# Patient Record
Sex: FEMALE | Race: WHITE | NOT HISPANIC OR LATINO | Employment: UNEMPLOYED | URBAN - METROPOLITAN AREA
[De-identification: names, ages, dates, MRNs, and addresses within clinical notes are randomized per-mention and may not be internally consistent; named-entity substitution may affect disease eponyms.]

---

## 2019-05-25 ENCOUNTER — HOSPITAL ENCOUNTER (EMERGENCY)
Facility: HOSPITAL | Age: 34
Discharge: HOME | End: 2019-05-25
Attending: EMERGENCY MEDICINE | Admitting: EMERGENCY MEDICINE

## 2019-05-25 VITALS
HEIGHT: 62 IN | BODY MASS INDEX: 21.16 KG/M2 | OXYGEN SATURATION: 97 % | HEART RATE: 91 BPM | DIASTOLIC BLOOD PRESSURE: 75 MMHG | SYSTOLIC BLOOD PRESSURE: 114 MMHG | RESPIRATION RATE: 18 BRPM | WEIGHT: 115 LBS | TEMPERATURE: 98.1 F

## 2019-05-25 DIAGNOSIS — F19.10 SUBSTANCE ABUSE (HCC): ICD-10-CM

## 2019-05-25 DIAGNOSIS — F41.9 ANXIETY: Primary | ICD-10-CM

## 2019-05-25 LAB
AMPHET+METHAMPHET UR QL: POSITIVE
ANION GAP SERPL CALCULATED.3IONS-SCNC: 14.7 MMOL/L
BARBITURATES UR QL SCN: NEGATIVE
BENZODIAZ UR QL SCN: NEGATIVE
BUN BLD-MCNC: 14 MG/DL (ref 6–20)
BUN/CREAT SERPL: 16.9 (ref 7–25)
CALCIUM SPEC-SCNC: 9.5 MG/DL (ref 8.6–10.5)
CANNABINOIDS SERPL QL: NEGATIVE
CHLORIDE SERPL-SCNC: 100 MMOL/L (ref 98–107)
CO2 SERPL-SCNC: 22.3 MMOL/L (ref 22–29)
COCAINE UR QL: NEGATIVE
CREAT BLD-MCNC: 0.83 MG/DL (ref 0.57–1)
ETHANOL BLD-MCNC: <10 MG/DL (ref 0–10)
ETHANOL UR QL: <0.01 %
GFR SERPL CREATININE-BSD FRML MDRD: 80 ML/MIN/1.73
GLUCOSE BLD-MCNC: 79 MG/DL (ref 65–99)
HCG SERPL QL: NEGATIVE
METHADONE UR QL SCN: NEGATIVE
OPIATES UR QL: NEGATIVE
OXYCODONE UR QL SCN: NEGATIVE
POTASSIUM BLD-SCNC: 4 MMOL/L (ref 3.5–5.2)
SODIUM BLD-SCNC: 137 MMOL/L (ref 136–145)

## 2019-05-25 PROCEDURE — 80307 DRUG TEST PRSMV CHEM ANLYZR: CPT | Performed by: EMERGENCY MEDICINE

## 2019-05-25 PROCEDURE — 93005 ELECTROCARDIOGRAM TRACING: CPT | Performed by: EMERGENCY MEDICINE

## 2019-05-25 PROCEDURE — 93010 ELECTROCARDIOGRAM REPORT: CPT | Performed by: INTERNAL MEDICINE

## 2019-05-25 PROCEDURE — 80048 BASIC METABOLIC PNL TOTAL CA: CPT | Performed by: EMERGENCY MEDICINE

## 2019-05-25 PROCEDURE — 99284 EMERGENCY DEPT VISIT MOD MDM: CPT

## 2019-05-25 PROCEDURE — 90791 PSYCH DIAGNOSTIC EVALUATION: CPT

## 2019-05-25 PROCEDURE — 84703 CHORIONIC GONADOTROPIN ASSAY: CPT | Performed by: EMERGENCY MEDICINE

## 2019-05-25 RX ORDER — DIAZEPAM 5 MG/1
5 TABLET ORAL ONCE
Status: COMPLETED | OUTPATIENT
Start: 2019-05-25 | End: 2019-05-25

## 2019-05-25 RX ORDER — CLONIDINE HYDROCHLORIDE 0.1 MG/1
0.1 TABLET ORAL ONCE
Status: COMPLETED | OUTPATIENT
Start: 2019-05-25 | End: 2019-05-25

## 2019-05-25 RX ORDER — ACETAMINOPHEN 500 MG
1000 TABLET ORAL ONCE
Status: COMPLETED | OUTPATIENT
Start: 2019-05-25 | End: 2019-05-25

## 2019-05-25 RX ORDER — SODIUM CHLORIDE 0.9 % (FLUSH) 0.9 %
10 SYRINGE (ML) INJECTION AS NEEDED
Status: DISCONTINUED | OUTPATIENT
Start: 2019-05-25 | End: 2019-05-26 | Stop reason: HOSPADM

## 2019-05-25 RX ADMIN — DIAZEPAM 5 MG: 5 TABLET ORAL at 17:51

## 2019-05-25 RX ADMIN — ACETAMINOPHEN 1000 MG: 500 TABLET, FILM COATED ORAL at 17:07

## 2019-05-25 RX ADMIN — CLONIDINE HYDROCHLORIDE 0.1 MG: 0.1 TABLET ORAL at 16:22

## 2020-07-05 ENCOUNTER — HOSPITAL ENCOUNTER (EMERGENCY)
Facility: HOSPITAL | Age: 35
Discharge: PSYCHIATRIC OR SUBSTANCE USE FACILITY | End: 2020-07-06
Attending: EMERGENCY MEDICINE

## 2020-07-05 DIAGNOSIS — R45.851 DEPRESSION WITH SUICIDAL IDEATION: Primary | ICD-10-CM

## 2020-07-05 DIAGNOSIS — F32.A DEPRESSION WITH SUICIDAL IDEATION: Primary | ICD-10-CM

## 2020-07-05 LAB
AMPHET+METHAMPHET UR QL: NEGATIVE
BARBITURATES UR QL SCN: NEGATIVE
BENZODIAZ UR QL SCN: NEGATIVE
CANNABINOIDS SERPL QL: NEGATIVE
COCAINE UR QL: NEGATIVE
ETHANOL BLD-MCNC: <10 MG/DL (ref 0–10)
ETHANOL UR QL: <0.01 %
HCG SERPL QL: NEGATIVE
HOLD SPECIMEN: NORMAL
METHADONE UR QL SCN: NEGATIVE
OPIATES UR QL: NEGATIVE
OXYCODONE UR QL SCN: NEGATIVE
WHOLE BLOOD HOLD SPECIMEN: NORMAL

## 2020-07-05 PROCEDURE — 80307 DRUG TEST PRSMV CHEM ANLYZR: CPT | Performed by: EMERGENCY MEDICINE

## 2020-07-05 PROCEDURE — 90791 PSYCH DIAGNOSTIC EVALUATION: CPT

## 2020-07-05 PROCEDURE — 84703 CHORIONIC GONADOTROPIN ASSAY: CPT | Performed by: EMERGENCY MEDICINE

## 2020-07-05 RX ORDER — ESCITALOPRAM OXALATE 10 MG/1
10 TABLET ORAL DAILY
Status: ON HOLD | COMMUNITY
End: 2020-07-08 | Stop reason: SDUPTHER

## 2020-07-05 NOTE — ED PROVIDER NOTES
EMERGENCY DEPARTMENT ENCOUNTER    Room Number:  41/41  Date of encounter:  7/8/2020  PCP: Provider, No Known  Historian: Patient      HPI:  Chief Complaint: Depressed and suicidal  A complete HPI/ROS/PMH/PSH/SH/FH are unobtainable due to: Not forthcoming with history    Context: Andreina Owen is a 34 y.o. female who presents to the ED c/o severe depression and having suicidal thoughts.  It is not clear how long this is been going on, and she said she has had some thoughts of suicide but no specific plan and has not done anything to attempt.  She denies alcohol or drugs.  Patient's not really forthcoming with any other aspects of the history and I cannot really get many more details.      PAST MEDICAL HISTORY  Active Ambulatory Problems     Diagnosis Date Noted   • Severe recurrent major depression without psychotic features (CMS/HCC) 07/06/2020   • Chronic hepatitis C (CMS/HCC) 07/07/2020   • Hypertension 07/07/2020   • Epilepsy (CMS/HCC) 07/07/2020   • OCD (obsessive compulsive disorder) 07/07/2020   • Metabolic acidosis 07/07/2020   • Tobacco abuse 07/07/2020     Resolved Ambulatory Problems     Diagnosis Date Noted   • No Resolved Ambulatory Problems     Past Medical History:   Diagnosis Date   • Anxiety    • Chronic hepatitis C without hepatic coma (CMS/HCC)    • Depression    • Hepatitis C    • History of methamphetamine abuse (CMS/HCC)    • Precipitate labor, with delivery          PAST SURGICAL HISTORY  History reviewed. No pertinent surgical history.      FAMILY HISTORY  History reviewed. No pertinent family history.      SOCIAL HISTORY  Social History     Socioeconomic History   • Marital status: Single     Spouse name: Not on file   • Number of children: Not on file   • Years of education: Not on file   • Highest education level: Not on file   Tobacco Use   • Smoking status: Current Every Day Smoker   • Smokeless tobacco: Never Used   Substance and Sexual Activity   • Alcohol use: Yes     Comment: rarely    • Drug use: Yes     Types: Methamphetamines     Comment: last use over 1 week ago   • Sexual activity: Defer         ALLERGIES  Patient has no known allergies.        REVIEW OF SYSTEMS  Review of Systems   Patient is a poor historian not forthcoming      PHYSICAL EXAM    I have reviewed the triage vital signs and nursing notes.    ED Triage Vitals [07/05/20 1736]   Temp Heart Rate Resp BP SpO2   96.9 °F (36.1 °C) 116 16 -- 100 %      Temp src Heart Rate Source Patient Position BP Location FiO2 (%)   Tympanic Monitor -- -- --       Physical Exam  GENERAL: Somnolent but arousable, flat affect  HENT: nares patent, NCAT  EYES: no scleral icterus, [place], EOMI  CV: Sinus tachycardia  RESPIRATORY: normal effort  ABDOMEN: soft  MUSCULOSKELETAL: no deformity  NEURO:, But arousable, moves all extremities, follows commands, walked in on her own  SKIN: warm, dry  Psych exam, again she is flat for affect, she has a depressed mood, she has a normal mental status, and she does have suicidal thoughts although they are not specific at all      LAB RESULTS  No results found for this or any previous visit (from the past 24 hour(s)).    Ordered the above labs and independently reviewed the results.        RADIOLOGY  No Radiology Exams Resulted Within Past 24 Hours    I ordered the above noted radiological studies. Reviewed by me and discussed with radiologist.  See dictation for official radiology interpretation.      PROCEDURES    Procedures      MEDICATIONS GIVEN IN ER    Medications - No data to display      PROGRESS, DATA ANALYSIS, CONSULTS, AND MEDICAL DECISION MAKING    All labs have been independently reviewed by me.  All radiology studies have been reviewed by me and discussed with radiologist dictating the report.   EKG's independently viewed and interpreted by me.  Discussion below represents my analysis of pertinent findings related to patient's condition, differential diagnosis, treatment plan and final  disposition.        ED Course as of Jul 08 2357   Sun Jul 05, 2020 2345 Alcohol and drug screen are negative.  hCG is negative    [DP]   2345 Access has seen the patient and evaluated and feel that she needs admission for her suicidal thoughts.  There is currently no bed available at our facility or at any other facilities in [facility] currently.  She will be kept in the ED and observed under safety watch until something is available.    [DP]      ED Course User Index  [DP] Margarito Greenwood MD               AS OF 23:57 VITALS:    BP - 122/75  HR - 75  TEMP - 96.9 °F (36.1 °C) (Tympanic)  O2 SATS - 98%        DIAGNOSIS  Final diagnoses:   Depression with suicidal ideation         DISPOSITION  Admit to behavioral health           Margarito Greenwood MD  07/08/20 7912

## 2020-07-06 ENCOUNTER — HOSPITAL ENCOUNTER (INPATIENT)
Facility: HOSPITAL | Age: 35
LOS: 2 days | Discharge: HOME | End: 2020-07-08
Attending: SPECIALIST | Admitting: SPECIALIST

## 2020-07-06 VITALS
OXYGEN SATURATION: 98 % | WEIGHT: 120 LBS | DIASTOLIC BLOOD PRESSURE: 75 MMHG | RESPIRATION RATE: 16 BRPM | BODY MASS INDEX: 21.26 KG/M2 | SYSTOLIC BLOOD PRESSURE: 122 MMHG | TEMPERATURE: 96.9 F | HEART RATE: 75 BPM | HEIGHT: 63 IN

## 2020-07-06 PROBLEM — F33.2 SEVERE RECURRENT MAJOR DEPRESSION WITHOUT PSYCHOTIC FEATURES (HCC): Status: ACTIVE | Noted: 2020-07-06

## 2020-07-06 LAB
ALBUMIN SERPL-MCNC: 4 G/DL (ref 3.5–5.2)
ALBUMIN/GLOB SERPL: 1.5 G/DL
ALP SERPL-CCNC: 37 U/L (ref 39–117)
ALT SERPL W P-5'-P-CCNC: 21 U/L (ref 1–33)
ANION GAP SERPL CALCULATED.3IONS-SCNC: 17.6 MMOL/L (ref 5–15)
AST SERPL-CCNC: 17 U/L (ref 1–32)
BILIRUB SERPL-MCNC: 0.2 MG/DL (ref 0–1.2)
BUN SERPL-MCNC: 10 MG/DL (ref 6–20)
BUN/CREAT SERPL: 14.5 (ref 7–25)
CALCIUM SPEC-SCNC: 9 MG/DL (ref 8.6–10.5)
CHLORIDE SERPL-SCNC: 110 MMOL/L (ref 98–107)
CO2 SERPL-SCNC: 14.4 MMOL/L (ref 22–29)
CREAT SERPL-MCNC: 0.69 MG/DL (ref 0.57–1)
GFR SERPL CREATININE-BSD FRML MDRD: 98 ML/MIN/1.73
GLOBULIN UR ELPH-MCNC: 2.7 GM/DL
GLUCOSE SERPL-MCNC: 70 MG/DL (ref 65–99)
POTASSIUM SERPL-SCNC: 3.9 MMOL/L (ref 3.5–5.2)
PROT SERPL-MCNC: 6.7 G/DL (ref 6–8.5)
SODIUM SERPL-SCNC: 142 MMOL/L (ref 136–145)
T4 FREE SERPL-MCNC: 1.29 NG/DL (ref 0.93–1.7)
TSH SERPL DL<=0.05 MIU/L-ACNC: 0.76 UIU/ML (ref 0.27–4.2)

## 2020-07-06 PROCEDURE — 84443 ASSAY THYROID STIM HORMONE: CPT | Performed by: SPECIALIST

## 2020-07-06 PROCEDURE — 84439 ASSAY OF FREE THYROXINE: CPT | Performed by: SPECIALIST

## 2020-07-06 PROCEDURE — 80053 COMPREHEN METABOLIC PANEL: CPT | Performed by: SPECIALIST

## 2020-07-06 RX ORDER — ESCITALOPRAM OXALATE 10 MG/1
10 TABLET ORAL DAILY
Status: DISCONTINUED | OUTPATIENT
Start: 2020-07-07 | End: 2020-07-08 | Stop reason: HOSPADM

## 2020-07-06 RX ORDER — ACETAMINOPHEN 325 MG/1
650 TABLET ORAL EVERY 4 HOURS PRN
Status: DISCONTINUED | OUTPATIENT
Start: 2020-07-06 | End: 2020-07-08 | Stop reason: HOSPADM

## 2020-07-06 RX ORDER — ALUMINA, MAGNESIA, AND SIMETHICONE 2400; 2400; 240 MG/30ML; MG/30ML; MG/30ML
15 SUSPENSION ORAL EVERY 6 HOURS PRN
Status: DISCONTINUED | OUTPATIENT
Start: 2020-07-06 | End: 2020-07-08 | Stop reason: HOSPADM
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
## 2020-07-06 NOTE — ED NOTES
Access called to inquire about the clean catch.  Advised that the patient stated she cannot urinate yet but has been given 2 cups of fluids.  Pt still unable to urinate.  Pt currently sleeping.        Destini Arnold, RN  07/05/20 2018       Destini Arnold, RN  07/05/20 2019

## 2020-07-06 NOTE — ED NOTES
Access finished talking with patient.  1:1 observation started again.      Destini Arnold, RN  07/05/20 3990

## 2020-07-06 NOTE — ED NOTES
Pt moved from room 42 to room 1, all personal belongings accounted for post move     Alexandria Escobar RN  07/05/20 6953

## 2020-07-06 NOTE — ED NOTES
Patient moved to room 41. Patient remains in mask from care in previous room. Patient was wearing facemask when I entered the room and throughout our encounter. I wore full protective equipment throughout this patient encounter including a face mask, and gloves. Hand hygiene was performed before donning protective equipment and after removal when leaving the room.  Awaiting room assignment/dispo from Tony Jett RN  07/06/20 3704

## 2020-07-06 NOTE — PLAN OF CARE
Problem: Feelings of Worthlessness, Hopelessness, Excessive Guilt (Depressive Signs/Symptoms) (Adult)  Goal: Enhanced Self-Esteem/Confidence (Depressive Signs/Symptoms)  Outcome: Ongoing (interventions implemented as appropriate)  Flowsheets (Taken 7/6/2020 1440)  Enhanced Self-Esteem/Confidence Action Step/Short Term Goal (STG) Established: 07/06/20  Enhanced Self-Esteem/Confidence Time Frame for Action Step (STG): 4 days  Enhanced Self-Esteem/Confidence Action Step (STG) Outcome: making progress toward outcome     Problem: Mood Impairment (Depressive Signs/Symptoms) (Adult)  Goal: Improved Mood Symptoms (Depressive Signs/Symptoms)  Outcome: Ongoing (interventions implemented as appropriate)  Flowsheets (Taken 7/6/2020 1440)  Improved Mood Symptoms Action Step/Short Term Goal (STG) Established: 07/06/20  Improved Mood Symptoms Time Frame for Action Step (STG): 4 days  Improved Mood Symptoms Action Step (STG) Outcome: making progress toward outcome     Problem: Overarching Goals (Adult)  Goal: Develops/Participates in Therapeutic [place] to Support Successful Transition  Outcome: Ongoing (interventions implemented as appropriate)  Flowsheets (Taken 7/6/2020 1440)  Develops/Participates in Therapeutic [place] to Support Successful Transition: making progress toward outcome     Problem: Overarching Goals (Adult)  Goal: Optimized Coping Skills in Response to Life Stressors  Outcome: Ongoing (interventions implemented as appropriate)  Flowsheets (Taken 7/6/2020 1440)  Optimized Coping Skills in Response to Life Stressors: making progress toward outcome     Problem: Overarching Goals (Adult)  Goal: Adheres to Safety Considerations for Self and Others  Outcome: Ongoing (interventions implemented as appropriate)  Flowsheets (Taken 7/6/2020 1440)  Adheres to Safety Considerations for Self and Others: making progress toward outcome     Problem: Patient Care Overview  Goal: Interprofessional Rounds/Family Conf  Outcome:  Ongoing (interventions implemented as appropriate)     Problem: Patient Care Overview  Goal: Discharge Needs Assessment  Outcome: Ongoing (interventions implemented as appropriate)     Problem: Patient Care Overview  Goal: Individualization and Mutuality  Outcome: Ongoing (interventions implemented as appropriate)

## 2020-07-06 NOTE — PLAN OF CARE
Problem: Patient Care Overview  Goal: Plan of Care Review  Outcome: Ongoing (interventions implemented as appropriate)  Flowsheets (Taken 7/6/2020 1809)  Progress: no change  Plan of Care Reviewed With: patient  Patient Agreement with Plan of Care: agrees  Outcome Summary: P admitted to [facility] due to thoughts of SI and feeling depressed.  Upon admission to the unit, pt took a shower, and was then very compliant with assessment.  Pt currently denies SI/HI/AVH.  Pt able to contract for safety at this time.  Pt did state that she is willing to try medication to help improve her mood, and has been very respectful to staff.  Pt monitored for safety.     Problem: Patient Care Overview  Goal: Individualization and Mutuality  Outcome: Ongoing (interventions implemented as appropriate)     Problem: Patient Care Overview  Goal: Discharge Needs Assessment  Outcome: Ongoing (interventions implemented as appropriate)     Problem: Patient Care Overview  Goal: Interprofessional Rounds/Family Conf  Outcome: Ongoing (interventions implemented as appropriate)     Problem: Overarching Goals (Adult)  Goal: Adheres to Safety Considerations for Self and Others  Outcome: Ongoing (interventions implemented as appropriate)  Flowsheets (Taken 7/6/2020 1809)  Adheres to Safety Considerations for Self and Others: making progress toward outcome     Problem: Overarching Goals (Adult)  Goal: Optimized Coping Skills in Response to Life Stressors  Outcome: Ongoing (interventions implemented as appropriate)  Flowsheets (Taken 7/6/2020 1809)  Optimized Coping Skills in Response to Life Stressors: making progress toward outcome     Problem: Overarching Goals (Adult)  Goal: Develops/Participates in Therapeutic [place] to Support Successful Transition  Outcome: Ongoing (interventions implemented as appropriate)  Flowsheets (Taken 7/6/2020 1809)  Develops/Participates in Therapeutic [place] to Support Successful Transition: making progress toward  outcome     Problem: Feelings of Worthlessness, Hopelessness, Excessive Guilt (Depressive Signs/Symptoms) (Adult)  Goal: Enhanced Self-Esteem/Confidence (Depressive Signs/Symptoms)  Outcome: Ongoing (interventions implemented as appropriate)  Flowsheets (Taken 7/6/2020 1809)  Enhanced Self-Esteem/Confidence Time Frame for Action Step (STG): 4 days  Enhanced Self-Esteem/Confidence Action Step (STG) Outcome: making progress toward outcome     Problem: Suicidal Behavior (Adult)  Goal: Suicidal Behavior is Absent/Minimized/Managed  Outcome: Ongoing (interventions implemented as appropriate)  Flowsheets (Taken 7/6/2020 1809)  Action Step/Short Term Goal (STG) Established: 7/6/2020  Suicidal Behavior Managed/Minimized Time Frame for Action Step (STG): 4 days  Suicidal Behavior Managed/Minimized Action Step (STG) Outcome: making progress toward outcome

## 2020-07-07 PROBLEM — E87.20 METABOLIC ACIDOSIS: Status: ACTIVE | Noted: 2020-07-07

## 2020-07-07 PROBLEM — F42.9 OCD (OBSESSIVE COMPULSIVE DISORDER): Status: ACTIVE | Noted: 2020-07-07

## 2020-07-07 PROBLEM — I10 HYPERTENSION: Status: ACTIVE | Noted: 2020-07-07

## 2020-07-07 PROBLEM — B18.2 CHRONIC HEPATITIS C (HCC): Status: ACTIVE | Noted: 2020-07-07

## 2020-07-07 PROBLEM — G40.909 EPILEPSY (HCC): Status: ACTIVE | Noted: 2020-07-07

## 2020-07-07 PROBLEM — Z72.0 TOBACCO ABUSE: Status: ACTIVE | Noted: 2020-07-07

## 2020-07-07 LAB
BASOPHILS # BLD AUTO: 0.05 10*3/MM3 (ref 0–0.2)
BASOPHILS NFR BLD AUTO: 0.9 % (ref 0–1.5)
DEPRECATED RDW RBC AUTO: 47.4 FL (ref 37–54)
EOSINOPHIL # BLD AUTO: 0.1 10*3/MM3 (ref 0–0.4)
EOSINOPHIL NFR BLD AUTO: 1.7 % (ref 0.3–6.2)
ERYTHROCYTE [DISTWIDTH] IN BLOOD BY AUTOMATED COUNT: 14.1 % (ref 12.3–15.4)
HCT VFR BLD AUTO: 38.6 % (ref 34–46.6)
HGB BLD-MCNC: 12.5 G/DL (ref 12–15.9)
IMM GRANULOCYTES # BLD AUTO: 0.02 10*3/MM3 (ref 0–0.05)
IMM GRANULOCYTES NFR BLD AUTO: 0.3 % (ref 0–0.5)
LYMPHOCYTES # BLD AUTO: 1.6 10*3/MM3 (ref 0.7–3.1)
LYMPHOCYTES NFR BLD AUTO: 27.6 % (ref 19.6–45.3)
MCH RBC QN AUTO: 29.2 PG (ref 26.6–33)
MCHC RBC AUTO-ENTMCNC: 32.4 G/DL (ref 31.5–35.7)
MCV RBC AUTO: 90.2 FL (ref 79–97)
MONOCYTES # BLD AUTO: 0.36 10*3/MM3 (ref 0.1–0.9)
MONOCYTES NFR BLD AUTO: 6.2 % (ref 5–12)
NEUTROPHILS NFR BLD AUTO: 3.67 10*3/MM3 (ref 1.7–7)
NEUTROPHILS NFR BLD AUTO: 63.3 % (ref 42.7–76)
NRBC BLD AUTO-RTO: 0 /100 WBC (ref 0–0.2)
PLATELET # BLD AUTO: 242 10*3/MM3 (ref 140–450)
PMV BLD AUTO: 10.9 FL (ref 6–12)
RBC # BLD AUTO: 4.28 10*6/MM3 (ref 3.77–5.28)
WBC # BLD AUTO: 5.8 10*3/MM3 (ref 3.4–10.8)

## 2020-07-07 PROCEDURE — 85025 COMPLETE CBC W/AUTO DIFF WBC: CPT | Performed by: SPECIALIST

## 2020-07-07 RX ORDER — HYDROXYZINE PAMOATE 25 MG/1
50 CAPSULE ORAL EVERY 4 HOURS PRN
Status: DISCONTINUED | OUTPATIENT
Start: 2020-07-07 | End: 2020-07-08 | Stop reason: HOSPADM

## 2020-07-07 RX ORDER — HYDROXYZINE HYDROCHLORIDE 25 MG/1
25 TABLET, FILM COATED ORAL 3 TIMES DAILY PRN
COMMUNITY
End: 2020-07-08 | Stop reason: HOSPADM

## 2020-07-07 RX ORDER — NICOTINE 21 MG/24HR
1 PATCH, TRANSDERMAL 24 HOURS TRANSDERMAL
Status: DISCONTINUED | OUTPATIENT
Start: 2020-07-07 | End: 2020-07-08 | Stop reason: HOSPADM

## 2020-07-07 RX ADMIN — ESCITALOPRAM 10 MG: 10 TABLET, FILM COATED ORAL at 08:20

## 2020-07-07 RX ADMIN — NICOTINE 1 PATCH: 21 PATCH, EXTENDED RELEASE TRANSDERMAL at 16:58

## 2020-07-07 RX ADMIN — HYDROXYZINE PAMOATE 50 MG: 25 CAPSULE ORAL at 12:37

## 2020-07-07 NOTE — PROGRESS NOTES
Continued Stay Note  [facility]     Patient Name: Andreina Owen  MRN: [id number]  Today's Date: 7/7/2020    Admit Date: 7/6/2020    Discharge Plan     Row Name 07/07/20 1002       Plan    Plan  Pt will return home.  Pt will receive an LOUISE consult & participate in therapeutic groups/activities on the unit.  SW will explore outpt providers.    Plan Comments  SW met w/pt to discuss tx & d/c planning.  Pt was able to verify demographic info but stated that she did not have a PCP.  When asked about emergency contacts; pt stated that she did not want anyone involved or contacted regarding her tx.  SW discussed outpt mental health providers; pt stated that she previously went to [facility] but didn't remember the last time she went there.  She declined any outpt mental health providers & resources.  SW asked if pt was interested in mtg w/a  for grief issues; pt declined this svcs also.  Pt asked would she be d/c'd today; SW informed her that d/c would be dictated by the Doctor and that she would meet w/him today.        Discharge Codes    No documentation.             BRITTANI Sol

## 2020-07-07 NOTE — PLAN OF CARE
Problem: Patient Care Overview  Goal: Plan of Care Review  Outcome: Ongoing (interventions implemented as appropriate)  Flowsheets (Taken 7/7/2020 1350)  Progress: no change  Plan of Care Reviewed With: patient  Patient Agreement with Plan of Care: agrees  Outcome Summary: Pt participates in groups and activities on the unit.  Pt denies SI/HI/AVH at this time, and states that she is able to contract for safety at this time.  Pt to continue to be monitored for safety.     Problem: Patient Care Overview  Goal: Individualization and Mutuality  Outcome: Ongoing (interventions implemented as appropriate)     Problem: Patient Care Overview  Goal: Discharge Needs Assessment  Outcome: Ongoing (interventions implemented as appropriate)     Problem: Patient Care Overview  Goal: Interprofessional Rounds/Family Conf  Outcome: Ongoing (interventions implemented as appropriate)     Problem: Overarching Goals (Adult)  Goal: Adheres to Safety Considerations for Self and Others  Outcome: Ongoing (interventions implemented as appropriate)  Flowsheets (Taken 7/7/2020 1354)  Adheres to Safety Considerations for Self and Others: making progress toward outcome     Problem: Overarching Goals (Adult)  Goal: Optimized Coping Skills in Response to Life Stressors  Outcome: Ongoing (interventions implemented as appropriate)  Flowsheets (Taken 7/7/2020 1357)  Optimized Coping Skills in Response to Life Stressors: making progress toward outcome     Problem: Overarching Goals (Adult)  Goal: Develops/Participates in Therapeutic [place] to Support Successful Transition  Outcome: Ongoing (interventions implemented as appropriate)  Flowsheets (Taken 7/7/2020 1357)  Develops/Participates in Therapeutic [place]ort to Support Successful Transition: making progress toward outcome     Problem: Mood Impairment (Depressive Signs/Symptoms) (Adult)  Goal: Improved Mood Symptoms (Depressive Signs/Symptoms)  Outcome: Ongoing (interventions implemented as  appropriate)  Flowsheets (Taken 7/7/2020 1352)  Improved Mood Symptoms Action Step/Short Term Goal (STG) Established: 7/7/2020  Improved Mood Symptoms Time Frame for Action Step (STG): 3 days  Improved Mood Symptoms Action Step (STG) Outcome: making progress toward outcome     Problem: Feelings of Worthlessness, Hopelessness, Excessive Guilt (Depressive Signs/Symptoms) (Adult)  Goal: Enhanced Self-Esteem/Confidence (Depressive Signs/Symptoms)  Outcome: Ongoing (interventions implemented as appropriate)  Flowsheets (Taken 7/7/2020 1357)  Enhanced Self-Esteem/Confidence Action Step/Short Term Goal (STG) Established: 7/7/2020  Enhanced Self-Esteem/Confidence Time Frame for Action Step (STG): 3 days  Enhanced Self-Esteem/Confidence Action Step (STG) Outcome: making progress toward outcome     Problem: Suicidal Behavior (Adult)  Goal: Suicidal Behavior is Absent/Minimized/Managed  Outcome: Ongoing (interventions implemented as appropriate)  Flowsheets (Taken 7/7/2020 1357)  Action Step/Short Term Goal (STG) Established: 7/7/2020  Suicidal Behavior Managed/Minimized Time Frame for Action Step (STG): 3 days  Suicidal Behavior Managed/Minimized Action Step (STG) Outcome: making progress toward outcome

## 2020-07-07 NOTE — PLAN OF CARE
Problem: Patient Care Overview  Goal: Plan of Care Review  Outcome: Ongoing (interventions implemented as appropriate)  Flowsheets  Taken 7/7/2020 0328  Progress: no change  Taken 7/6/2020 2300  Plan of Care Reviewed With: patient  Patient Agreement with Plan of Care: agrees  Note:   Anxiety and depression rated 10/10, and no SI/HI. Pt has slept throughout most of shift. Will continue to monitor and assess.      Goal: Individualization and Mutuality  Outcome: Ongoing (interventions implemented as appropriate)  Goal: Discharge Needs Assessment  Outcome: Ongoing (interventions implemented as appropriate)  Goal: Interprofessional Rounds/Family Conf  Outcome: Ongoing (interventions implemented as appropriate)     Problem: Overarching Goals (Adult)  Goal: Adheres to Safety Considerations for Self and Others  Outcome: Ongoing (interventions implemented as appropriate)  Flowsheets (Taken 7/7/2020 0328)  Adheres to Safety Considerations for Self and Others: making progress toward outcome  Goal: Optimized Coping Skills in Response to Life Stressors  Outcome: Ongoing (interventions implemented as appropriate)  Flowsheets (Taken 7/7/2020 0328)  Optimized Coping Skills in Response to Life Stressors: making progress toward outcome  Goal: Develops/Participates in Therapeutic [place] to Support Successful Transition  Outcome: Ongoing (interventions implemented as appropriate)  Flowsheets (Taken 7/7/2020 0328)  Develops/Participates in Therapeutic [place] to Support Successful Transition: making progress toward outcome     Problem: Mood Impairment (Depressive Signs/Symptoms) (Adult)  Goal: Improved Mood Symptoms (Depressive Signs/Symptoms)  Outcome: Ongoing (interventions implemented as appropriate)  Flowsheets (Taken 7/7/2020 0328)  Improved Mood Symptoms Time Frame for Action Step (STG): 3 days  Improved Mood Symptoms Action Step (STG) Outcome: making progress toward outcome     Problem: Feelings of Worthlessness,  Hopelessness, Excessive Guilt (Depressive Signs/Symptoms) (Adult)  Goal: Enhanced Self-Esteem/Confidence (Depressive Signs/Symptoms)  Outcome: Ongoing (interventions implemented as appropriate)  Flowsheets (Taken 7/7/2020 0328)  Enhanced Self-Esteem/Confidence Time Frame for Action Step (STG): 3 days  Enhanced Self-Esteem/Confidence Action Step (STG) Outcome: making progress toward outcome     Problem: Suicidal Behavior (Adult)  Goal: Suicidal Behavior is Absent/Minimized/Managed  Outcome: Ongoing (interventions implemented as appropriate)  Flowsheets (Taken 7/7/2020 0328)  Suicidal Behavior Managed/Minimized Time Frame for Action Step (STG): 3 days  Suicidal Behavior Managed/Minimized Action Step (STG) Outcome: making progress toward outcome

## 2020-07-07 NOTE — PLAN OF CARE
Problem: Patient Care Overview  Goal: Interprofessional Rounds/Family Conf  Outcome: Ongoing (interventions implemented as appropriate)  Flowsheets (Taken 7/7/2020 0959)  Participants: art therapy; ; pastoral care; pharmacy; nursing; social work; psychiatrist  Summary: Treatment team met to discuss pt's plan of care.  Pt will receive an LOUISE consult & participate in therapeutic groups/activities on the unit.  SW will explore outpt providers.  Progress & svcs needed will be assessed ongoing.     Problem: Mood Impairment (Depressive Signs/Symptoms) (Adult)  Goal: Improved Mood Symptoms (Depressive Signs/Symptoms)  Outcome: Ongoing (interventions implemented as appropriate)  Flowsheets  Taken 7/7/2020 0959 by Kelsey Briscoe CSW  Improved Mood Symptoms Time Frame for Action Step (STG): 4 days  Taken 7/7/2020 0328 by Tanika Mathews RN  Improved Mood Symptoms Action Step (STG) Outcome: making progress toward outcome     Problem: Feelings of Worthlessness, Hopelessness, Excessive Guilt (Depressive Signs/Symptoms) (Adult)  Goal: Enhanced Self-Esteem/Confidence (Depressive Signs/Symptoms)  Outcome: Ongoing (interventions implemented as appropriate)  Flowsheets (Taken 7/7/2020 0328 by Tanika Mathews RN)  Enhanced Self-Esteem/Confidence Action Step (STG) Outcome: making progress toward outcome     Problem: Suicidal Behavior (Adult)  Goal: Suicidal Behavior is Absent/Minimized/Managed  Outcome: Ongoing (interventions implemented as appropriate)  Flowsheets  Taken 7/7/2020 0959 by Kelsey Briscoe CSW  Suicidal Behavior Managed/Minimized Time Frame for Action Step (STG): 4 days  Taken 7/7/2020 0328 by Tanika Mathews RN  Suicidal Behavior Managed/Minimized Action Step (STG) Outcome: making progress toward outcome     Problem: Patient Care Overview  Goal: Individualization and Mutuality  Flowsheets (Taken 7/7/2020 0959)  Patient Personal Strengths: resourceful; stable living environment    Patient/Guardian  Signature: __________________________________            Psychiatrist Signature: ______________________________________             Therapist Signature: ________________________________________         Nurse Signature: ___________________________________________          Staff Signature: ____________________________________________            Staff Signature: ____________________________________________          Staff Signature: ____________________________________________          Staff Signature:

## 2020-07-07 NOTE — PROGRESS NOTES
Clinical Pharmacy Services: Medication History    Andreina Owen is a 34 y.o. female presenting to [facility] for Severe recurrent major depression without psychotic features (CMS/HCC) [F33.2]    She  has a past medical history of Anxiety, Chronic hepatitis C without hepatic coma (CMS/HCC), Depression, Epilepsy (CMS/HCC), Hepatitis C, History of methamphetamine abuse (CMS/HCC), Hypertension, OCD (obsessive compulsive disorder), OCD (obsessive compulsive disorder), and Precipitate labor, with delivery.    Allergies as of 07/06/2020    (No Known Allergies)       Medication information was obtained from: patient  Pharmacy and Phone Number: [facility]: [id number]    Prior to Admission Medications       Prescriptions Last Dose Informant Patient Reported? Taking?    escitalopram (LEXAPRO) 10 MG tablet   Yes No    Take 10 mg by mouth Daily.    hydrOXYzine (ATARAX) 25 MG tablet  Other Yes No    Take 25 mg by mouth 3 (Three) Times a Day As Needed for Anxiety.                Medication notes:   Spoke to patient to confirm medications. She has not taken Lexapro for a few months but when she did, it was effective. States at one point she was on 20 or 30 mg. Jesse prescribed hydroxyzine 25 mg TID PRN for anxiety on discharge on 7/3/20. Patient has not filled script yet. Would like MTB with both meds on discharge.    This medication list is complete to the best of my knowledge as of 7/7/2020    Please call if questions.    Sandip Marshall, Pharmacy Intern    7/7/2020 10:41

## 2020-07-07 NOTE — CONSULTS
Consultation     Patient Name: Andreina Owen  Age/Sex: 34 y.o. female  : 1985  MRN: [id number]    Date of Admission: 2020  Date of Encounter Visit: 20  Encounter Provider: NOEMI Rosa  Place of Service: [facility]  Patient Care Team:  Provider, No Known as PCP - General    Subjective:     Consults  Reason for consultation: Medical evaluation contributing to current psychiatric illness.    Chief Complaint:   Anxiety and recurrent suicidal ideation    History of Present Illness  Andreina Owen is a 34 y.o. female who was admitted to the Crisis Management Unit for further evaluation regarding suicidal ideation.  We were asked to see and evaluate her medical issues as they may pertain to her current psychiatric illness. Patient has a history of epilepsy, hepatitis C, OCD, HTN and methamphethamine use. Denies any recent illnesses, fever, chills,  Headache, cough, SOA, chest pain, N/V/D, dizziness or seizure.     She reports being diagnosed with seizures at age 19, but is not on any preventative medications. Denies any recent seizure in the past year. She does drink alcohol on occasion and last use was a week ago. She smokes about 1/2 PPD. She has had prior IV drug use and reports using clean needles. She had a prior history of hepatitis C, but denies any treatment and reports that it went away on its own. Denies any prior blood infections. She has required multiple psychiatry admissions in the past due to abuse of methamphetamines. Denies any benzodiazepine use. She was previously on Lexapro and Hydroxyzine, but has not been taking lately. Recently she has been having some suicidal ideations related to relationship issues and life stressors.     Labs in [place] showed CO2 14.4m chloride 110 and alkaline phos 37. UDS was negative and alcohol levels were WNL.     Review of Systems   Constitutional: Negative for chills, fatigue and fever.   HENT: Negative for congestion and trouble  swallowing.    Eyes: Negative for visual disturbance.   Respiratory: Negative for cough, shortness of breath and wheezing.    Cardiovascular: Positive for palpitations. Negative for chest pain and leg swelling.   Gastrointestinal: Negative for abdominal pain, constipation, diarrhea, nausea and vomiting.   Genitourinary: Negative for dysuria.   Musculoskeletal: Negative for back pain.   Neurological: Negative for dizziness, syncope and weakness.   Psychiatric/Behavioral: Positive for suicidal ideas. Negative for confusion and sleep disturbance. The patient is nervous/anxious.    All other systems reviewed and are negative.       Past Medical and Surgical History:  Past Medical History:   Diagnosis Date   • Anxiety    • Chronic hepatitis C without hepatic coma (CMS/HCC)    • Depression    • Epilepsy (CMS/HCC)    • Hepatitis C    • History of methamphetamine abuse (CMS/HCC)    • Hypertension    • OCD (obsessive compulsive disorder)    • OCD (obsessive compulsive disorder)    • Precipitate labor, with delivery        History reviewed. No pertinent surgical history.    Home Medications:   Medications Prior to Admission   Medication Sig Dispense Refill Last Dose   • escitalopram (LEXAPRO) 10 MG tablet Take 10 mg by mouth Daily.   5/1/2020   • hydrOXYzine (ATARAX) 25 MG tablet Take 25 mg by mouth 3 (Three) Times a Day As Needed for Anxiety.          Inpatient Medications:  Scheduled Meds:  escitalopram 10 mg Oral Daily   nicotine 1 patch Transdermal Q24H     Continuous Infusions:   PRN Meds:.•  acetaminophen  •  aluminum-magnesium hydroxide-simethicone  •  hydrOXYzine pamoate  •  magnesium hydroxide    Allergies:  No Known Allergies    Past Social History:  Social History     Socioeconomic History   • Marital status: Single     Spouse name: Not on file   • Number of children: Not on file   • Years of education: Not on file   • Highest education level: Not on file   Tobacco Use   • Smoking status: Current Every Day Smoker    • Smokeless tobacco: Never Used   Substance and Sexual Activity   • Alcohol use: Yes     Comment: rarely   • Drug use: Yes     Types: Methamphetamines     Comment: last use over 1 week ago   • Sexual activity: Defer       Past Family History:  History reviewed. No pertinent family history.    Objective:   Temp:  [97.5 °F (36.4 °C)] 97.5 °F (36.4 °C)  Heart Rate:  [65] 65  Resp:  [14-16] 16  BP: (107-137)/(68-95) 107/68   SpO2:  [98 %-100 %] 98 %  on    Device (Oxygen Therapy): room air    Intake/Output Summary (Last 24 hours) at 7/7/2020 1516  Last data filed at 7/7/2020 1231  Gross per 24 hour   Intake 480 ml   Output --   Net 480 ml     Body mass index is 21.33 kg/m².      07/06/20  1610   Weight: 54.6 kg (120 lb 6.4 oz)     Weight change:   Ventilator/Non-Invasive Ventilation Settings (From admission, onward)    None          Physical Exam   Constitutional: She is oriented to person, place, and time. She appears well-developed and well-nourished.   HENT:   Head: Normocephalic and atraumatic.   Eyes: Conjunctivae are normal. No scleral icterus.   Neck: Neck supple. No tracheal deviation present.   Cardiovascular: Normal rate, regular rhythm, normal heart sounds and intact distal pulses.   No murmur heard.  Pulmonary/Chest: Effort normal and breath sounds normal. No respiratory distress. She has no wheezes.   Abdominal: Soft. Bowel sounds are normal. There is no tenderness.   Musculoskeletal: Normal range of motion. She exhibits no edema.   Neurological: She is alert and oriented to person, place, and time.   Skin: Skin is warm and dry.   Psychiatric: Her behavior is normal.   irritable         Lab Review:     Results from last 7 days   Lab Units 07/06/20  1641 07/03/20  1045   SODIUM mmol/L 142 137   POTASSIUM mmol/L 3.9 3.3*   CHLORIDE mmol/L 110* 102   TOTAL CO2 mmol/L  --  26   CO2 mmol/L 14.4*  --    BUN mg/dL 10 11   CREATININE mg/dL 0.69 0.6*   EGFR IF NONAFRICN AM mL/min/1.73 98  --    GLUCOSE mg/dL 70   --    CALCIUM mg/dL 9.0 8.9   AST (SGOT) U/L 17 25   ALT (SGPT) U/L 21 28     Estimated Creatinine Clearance: 100 mL/min (by C-G formula based on SCr of 0.69 mg/dL).      Results from last 7 days   Lab Units 07/03/20  1045   TROPONIN I ng/mL <0.012     Results from last 7 days   Lab Units 07/07/20  0804 07/03/20  1045   WBC 10*3/mm3 5.80 6.74   HEMOGLOBIN g/dL 12.5 12.6   HEMATOCRIT % 38.6 37.5   PLATELETS 10*3/mm3 242 209   MCV fL 90.2 88.4   MCH pg 29.2 29.7   MCHC g/dL 32.4 33.6   RDW % 14.1 14.3   RDW-SD fl 47.4  --    MPV fL 10.9 10.8   NEUTROPHIL % % 63.3 67.6   LYMPHOCYTE % % 27.6 21.7   MONOCYTES % % 6.2 9.9   EOSINOPHIL % % 1.7 0.4   BASOPHIL % % 0.9 0.3   IMM GRAN % % 0.3 0.1*   NEUTROS ABS 10*3/mm3 3.67 4.55   LYMPHS ABS 10*3/mm3 1.60 1.46   MONOS ABS 10*3/mm3 0.36 0.67   EOS ABS 10*3/mm3 0.10 0.03   BASOS ABS 10*3/mm3 0.05 0.02   IMMATURE GRANS (ABS) 10*3/mm3 0.02 0.01   NRBC /100 WBC 0.0 0     Results from last 7 days   Lab Units 07/03/20  1045   INR INR 1.1   APTT s 28.6               Invalid input(s): LDLCALC      Results from last 7 days   Lab Units 07/06/20  1641   TSH uIU/mL 0.758                                       Imaging:  Imaging Results (Last 24 Hours)     ** No results found for the last 24 hours. **          EKG:  No orders to display       I reviewed the patient's new clinical results, lab tests and medications.     Problem List:     Active Hospital Problems    Diagnosis  POA   • Chronic hepatitis C (CMS/HCC) [B18.2]  Unknown   • Hypertension [I10]  Unknown   • Epilepsy (CMS/HCC) [G40.909]  Unknown   • OCD (obsessive compulsive disorder) [F42.9]  Unknown   • Severe recurrent major depression without psychotic features (CMS/HCC) [F33.2]  Yes      Resolved Hospital Problems   No resolved problems to display.       Assessment and Plan:       Metabolic acidosis  -likely from methamphetamines and dehydration. Urinating well and tolerating diet.   -encouraged increased oral fluid  intake    Chronic hepatitis C (CMS/HCC)  - hepatitis C PCR from 2017 was negative at [place]. Liver enzymes stable. Unclear if true infection.   -avoid IV drug use and follow up with PCP      Hypertension  -BP stable to borderline low. Not on medications at home. Possible increased in the past due to agitation and withdrawal.       Epilepsy (CMS/HCC)  -unclear if true epilepsy vs from withdrawal. Not on AEDs and no recent activity  -encouraged to avoid alcohol and drug use      methamphetamine and tobacco abuse  -nicotine patch  -discussed cessation and plans to follow up in home town in [place], [place] for LOUISE treatment      Severe recurrent major depression without psychotic features (CMS/HCC)  -management per psychiatry    The patient seems medically stable at this time.  There are no medical issues which need to be addressed while she is under psychiatric care and may continue to follow up with her PCP as an outpatient. Will sign off, but please call if any issues.       NOEMI Rosa  [facility]  07/07/20  3:16 PM    Dictated utilizing Dragon dictation

## 2020-07-07 NOTE — CONSULTS
"Reviewed chart and interviewed pt. Pt stated she had completed LOUISE treatment and was abstinent for 1 year. Pt stated she would like to go back to [facility] and not interested in any LOUISE resources here .  Pt stated she did not have a sponsor and \"I did my own thing when staying sober\"   Educated on LOUISE and co-occurring disorders. Gave pt all area LOUISE resources and services. Gave pt information on schedule and access to virtual and in person recovery area meetings. AA, NA, [facility], Smart recovery, and Celebrate recovery. Encouraged pt to attend recovery support meetings in [place] and or where ever she is residing.  Encouraged pt to attend 90/90 meetings, get sponsor, and work recovery program. Gave pt listings of sober living facilities in area with demographics and criteria for admissions. Pt focus is on getting a phone and going back to [place].   "

## 2020-07-07 NOTE — H&P
IDENTIFYING INFORMATION: The patient is a 34-year-old white female well-known to this physician, admitted with recurrent suicidal ideation related to multiple life stressors, as well as relapse of methamphetamine abuse    CHIEF COMPLAINT: Everything went wrong    INFORMANT: Patient and chart    RELIABILITY: Good    HISTORY OF PRESENT ILLNESS: The patient is a 34-year-old unmarried white female well-known to this physician from several previous admissions to our [facility] where she was under the care of this physician.  The patient has a history of depression complicated by her abuse of methamphetamine.  The patient reports that she recently moved back to the [facility] from [facility] where she states she had been doing well.  Upon arriving in [place], the patient's boyfriend, with whom she had hoped to reconcile with her move the level into their relationship.  Additionally, the patient's phone was recently stalling rendering her unable to reach her friends in [place] or her mother.  The patient was reporting positive suicidal ideation and does admit to a recent relapse of methamphetamine use.  She denies recent changes in sleep or appetite.  She does complain of significant anxiety.  The patient is continued to comply with previous prescribed Lexapro per her report.    PAST PSYCHIATRIC HISTORY: The patient has history of several psychiatric admissions mainly related to her abuse of methamphetamine.    PAST MEDICAL HISTORY: Significant for hepatitis C    MEDICATIONS:   Medications Prior to Admission   Medication Sig Dispense Refill Last Dose   • escitalopram (LEXAPRO) 10 MG tablet Take 10 mg by mouth Daily.   5/1/2020   • hydrOXYzine (ATARAX) 25 MG tablet Take 25 mg by mouth 3 (Three) Times a Day As Needed for Anxiety.            ALLERGIES: None field    FAMILY HISTORY: Noncontributory    SOCIAL HISTORY: The patient is currently staying with friends.  She is unemployed.  She  has a history of methamphetamine abuse as described previously.    MENTAL STATUS EXAM: The patient is well-developed well-nourished white female appearing her stated age.  She has no apparent physical distress at the time of the examination.  She is awake alert and oriented in all spheres.  Her mood is mildly dysphoric her affect congruent.  Speech is relevant coherent.  There are no deficits memory cognition noted.  Intelligence is judged to be in the average range based on fund of knowledge, the patient is cooperative throughout interview.  She continues to endorse positive suicidal ideation she denies oscillation.  She denies any psychotic symptoms, her judgment insight appear to be recent intact.  No signs of substance withdrawal noted.    ASSETS/LIABILITIES: Motivation for change/homelessness lack of resources    DIAGNOSTIC IMPRESSION: Major depressive sworder recurrent moderate, methamphetamine use disorder, hepatitis C     PLAN: The patient remains hospitalized for safety stabilization.  We will restart Lexapro and will start the patient on Vistaril 50 mg every 4 hours.  Anxiety.  Social work staff will meet with the patient regarding her potential wish to return to [place], [place], and a chemical dependence evaluation will take place.  Estimated length stay in the hospital is 2 to 3 days.

## 2020-07-07 NOTE — PLAN OF CARE
Problem: Patient Care Overview  Goal: Discharge Needs Assessment  Flowsheets  Taken 7/7/2020 1005 by Kelsey Briscoe CSW  Concerns Comments: Pt will return home.  Pt will receive an LOUISE consult & participate in therapeutic groups/activities on the unit.  SW will explore outpt providers.  Taken 7/6/2020 1428 by Alexandria Crockett RN  Transportation Anticipated: public transportation;family or friend will provide;car, drives self  Taken 7/7/2020 1001 by Kelsey Briscoe CSW  Concerns to be Addressed: coping/stress;decision making;mental health;discharge planning;substance/tobacco abuse/use;suicidal;grief and loss  Patient/Family Anticipated Services at Transition: mental health services  Patient/Family Anticipates Transition to: home

## 2020-07-08 VITALS
WEIGHT: 120.4 LBS | HEIGHT: 63 IN | OXYGEN SATURATION: 99 % | SYSTOLIC BLOOD PRESSURE: 100 MMHG | BODY MASS INDEX: 21.33 KG/M2 | TEMPERATURE: 97.8 F | RESPIRATION RATE: 18 BRPM | HEART RATE: 75 BPM | DIASTOLIC BLOOD PRESSURE: 69 MMHG

## 2020-07-08 RX ORDER — ESCITALOPRAM OXALATE 10 MG/1
10 TABLET ORAL DAILY
Qty: 30 TABLET | Refills: 1 | Status: SHIPPED | OUTPATIENT
Start: 2020-07-08

## 2020-07-08 RX ORDER — NICOTINE 21 MG/24HR
1 PATCH, TRANSDERMAL 24 HOURS TRANSDERMAL
Qty: 7 PATCH | Refills: 0 | Status: SHIPPED | OUTPATIENT
Start: 2020-07-08

## 2020-07-08 RX ORDER — HYDROXYZINE PAMOATE 50 MG/1
50 CAPSULE ORAL EVERY 4 HOURS PRN
Qty: 75 CAPSULE | Refills: 1 | Status: SHIPPED | OUTPATIENT
Start: 2020-07-08

## 2020-07-08 RX ADMIN — ESCITALOPRAM 10 MG: 10 TABLET, FILM COATED ORAL at 08:46

## 2020-07-08 NOTE — PLAN OF CARE
Problem: Patient Care Overview  Goal: Plan of Care Review  Outcome: Ongoing (interventions implemented as appropriate)  Flowsheets  Taken 7/8/2020 0342  Progress: no change  Taken 7/7/2020 9645  Plan of Care Reviewed With: patient  Patient Agreement with Plan of Care: agrees  Note:   Anxiety rated 9/10, and depression 8/10. No SI/HI. Pt has been isolative to room. Will continue to monitor and assess.     Goal: Individualization and Mutuality  Outcome: Ongoing (interventions implemented as appropriate)  Goal: Discharge Needs Assessment  Outcome: Ongoing (interventions implemented as appropriate)  Goal: Interprofessional Rounds/Family Conf  Outcome: Ongoing (interventions implemented as appropriate)     Problem: Overarching Goals (Adult)  Goal: Adheres to Safety Considerations for Self and Others  Outcome: Ongoing (interventions implemented as appropriate)  Flowsheets (Taken 7/8/2020 0342)  Adheres to Safety Considerations for Self and Others: making progress toward outcome  Goal: Optimized Coping Skills in Response to Life Stressors  Outcome: Ongoing (interventions implemented as appropriate)  Flowsheets (Taken 7/8/2020 0342)  Optimized Coping Skills in Response to Life Stressors: making progress toward outcome  Goal: Develops/Participates in Therapeutic Hustler to Support Successful Transition  Outcome: Ongoing (interventions implemented as appropriate)  Flowsheets (Taken 7/8/2020 0342)  Develops/Participates in Therapeutic Hustler to Support Successful Transition: making progress toward outcome     Problem: Mood Impairment (Depressive Signs/Symptoms) (Adult)  Goal: Improved Mood Symptoms (Depressive Signs/Symptoms)  Outcome: Ongoing (interventions implemented as appropriate)  Flowsheets (Taken 7/8/2020 0342)  Improved Mood Symptoms Time Frame for Action Step (STG): 2 days  Improved Mood Symptoms Action Step (STG) Outcome: making progress toward outcome     Problem: Feelings of Worthlessness, Hopelessness,  Excessive Guilt (Depressive Signs/Symptoms) (Adult)  Goal: Enhanced Self-Esteem/Confidence (Depressive Signs/Symptoms)  Outcome: Ongoing (interventions implemented as appropriate)  Flowsheets (Taken 7/8/2020 0342)  Enhanced Self-Esteem/Confidence Time Frame for Action Step (STG): 2 days  Enhanced Self-Esteem/Confidence Action Step (STG) Outcome: making progress toward outcome     Problem: Suicidal Behavior (Adult)  Goal: Suicidal Behavior is Absent/Minimized/Managed  Outcome: Ongoing (interventions implemented as appropriate)  Flowsheets (Taken 7/8/2020 0342)  Suicidal Behavior Managed/Minimized Time Frame for Action Step (STG): 2 days  Suicidal Behavior Managed/Minimized Action Step (STG) Outcome: making progress toward outcome

## 2020-07-08 NOTE — PROGRESS NOTES
Continued Stay Note  [facility]     Patient Name: Andreina Owen  MRN: [id number]  Today's Date: 7/8/2020    Admit Date: 7/6/2020    Discharge Plan     Row Name 07/08/20 1346       Plan    Final Discharge Disposition Code  01 - home or self-care    Final Note  Pt was discharged per Dr. Siu's orders.  SW met w/pt to discusss follow-up care.  Pt declined any mental health/substance abuse appts stating that she was moving back to [place], [place].  SW asked about pt's mental state & did she feel comfortable w/d/c; pt stated yes. SW gave a list of resources for mental health/substance abuse providers in the [facility] just in case pt stays in the area.  Pt did not have transportation home; SW completed a cab voucher for pt to be transported to [address] [place] [address] w/no stops, no tip.        Discharge Codes    No documentation.       Expected Discharge Date and Time     Expected Discharge Date Expected Discharge Time    Jul 8, 2020 12:50 PM            BRITTANI Sol

## 2020-07-08 NOTE — DISCHARGE SUMMARY
DATES OF ADMISSION: 7/6/2020-7/8/2020    REASON FOR ADMISSION: The patient is a 34-year-old white female admitted with increasing depression and recent relapse of methamphetamine use    LABS: See chart    HOSPITAL COURSE: Patient was admitted to the crisis management unit and placed on suicide precautions.  She was pleasant cooperative with staff and was able to promise safety the hospital.  She was restarted on Lexapro 10 mg daily, and hydroxyzine 50 mg every 6 hours was ordered for anxiety on a as needed basis.  She reported disappointment at her living situation, having moved to the [facility] only to have her boyfriend, with whom she had wished to reunite, break-up with her shortly after her return.  She expressed a wish to return to [facility] where she had been doing well.  By 7/8/2020 patient had made arrangements for return to [place],.  She was brought euthymic and denied suicidal ideation.  As per her request, discharge was ordered.    FINAL DIAGNOSIS: Major depression disorder recurrent moderate, methamphetamine use disorder, hepatitis C    DISPOSITION ON DISCHARGE: A full listing of the patient's medications is provided below.  Follow-up will take place with community mental health resources    PROGNOSIS: Fair, but will be darkened considerably should the patient continue to abuse methamphetamine.       Discharge Medications      New Medications      Instructions Start Date   hydrOXYzine pamoate 50 MG capsule  Commonly known as:  VISTARIL   50 mg, Oral, Every 4 Hours PRN      nicotine 21 MG/24HR patch  Commonly known as:  NICODERM CQ   1 patch, Transdermal, Every 24 Hours Scheduled         Continue These Medications      Instructions Start Date   escitalopram 10 MG tablet  Commonly known as:  LEXAPRO   10 mg, Oral, Daily         Stop These Medications    hydrOXYzine 25 MG tablet  Commonly known as:  ATARAX

## 2020-07-09 ENCOUNTER — TELEPHONE (OUTPATIENT)
Dept: PSYCHIATRY | Facility: HOSPITAL | Age: 35
End: 2020-07-09

## 2020-07-09 NOTE — TELEPHONE ENCOUNTER
"Patient reports she is doing \"alright.\" She states she has her medications and understands her discharge instructions. No further assistance requested at this time.  "